# Patient Record
Sex: MALE | Race: WHITE | NOT HISPANIC OR LATINO | Employment: FULL TIME | URBAN - METROPOLITAN AREA
[De-identification: names, ages, dates, MRNs, and addresses within clinical notes are randomized per-mention and may not be internally consistent; named-entity substitution may affect disease eponyms.]

---

## 2018-03-20 ENCOUNTER — OFFICE VISIT (OUTPATIENT)
Dept: SLEEP CENTER | Facility: HOSPITAL | Age: 53
End: 2018-03-20
Attending: INTERNAL MEDICINE

## 2018-03-20 VITALS
HEART RATE: 60 BPM | HEIGHT: 73 IN | BODY MASS INDEX: 31.54 KG/M2 | DIASTOLIC BLOOD PRESSURE: 82 MMHG | WEIGHT: 238 LBS | SYSTOLIC BLOOD PRESSURE: 130 MMHG

## 2018-03-20 DIAGNOSIS — F51.12 INSUFFICIENT SLEEP SYNDROME: ICD-10-CM

## 2018-03-20 DIAGNOSIS — E66.9 OBESITY (BMI 30-39.9): ICD-10-CM

## 2018-03-20 DIAGNOSIS — R68.2 DRY MOUTH: ICD-10-CM

## 2018-03-20 DIAGNOSIS — G47.33 OSA (OBSTRUCTIVE SLEEP APNEA): Primary | ICD-10-CM

## 2018-03-20 DIAGNOSIS — G47.10 HYPERSOMNIA: ICD-10-CM

## 2018-03-20 NOTE — PROGRESS NOTES
Follow-Up Note - Sleep Center   Eric Graciela  46 y o  male  :1965  [id number]    CC: I saw this patient for follow-up in clinic today for his Sleep Disordered Breathing, Coexisting Sleep and Medical Problems  Medications, Past, Family & Social History were reviewed  Interval changes notable for none reported   He  has no past medical history on file  He currently has no medications in their medication list     ROS: reviewed, see attached   HPI:  With respect to positive airway pressure therapy (PAP) compliance data download shows: [ he is using PAP > 4 hours per night 97% of the time and AHI is 9/hour at 90th percentile pressure of 13cm H2O ]   Eric reports:   -  some difficulty tolerating PAP;  dry mouth and air leaks from mask  -  benefiting from use ; sleeping better      Sleep Routine:  Sade Alvarado reports getting 6 hours sleep week nights in a little more on weekends; he has no difficulty initiating or maintaining sleep   He awakens spontaneously feeling refreshed He denies excessive drowsiness  He rated himself at Total score: 10 /24 on the [place] sleepiness scale  Would doze if sedentary  EXAM: Vitals are stable: Blood pressure 130/82, pulse 60, height 6' 1" (1 854 m), weight 108 kg (238 lb)  Body mass index is 31 4 kg/m²  Manuel Crawford Neck Circumference: 42 cm  Patient is alert, orientated, cooperative and in no distress  Mental state appears normal  There are  facial pressure marks or rashes  Apart from weight gain, Physical findings are essentially unchanged from previous  IMPRESSION:     1  MARYANA (obstructive sleep apnea)  Cpap DME   2  Hypersomnia     3  Insufficient sleep syndrome     4  Dry mouth     5  Obesity (BMI 30-39  9)         PLAN:  1  Treatment with  PAP is medically necessary and Sade Alvarado is agreable to continue use  2  Pressure setting: Continue at higher setting of 14 cm H2O in view of the AHI and weight gain    3   Instruction on care of equipment and strategies to improve comfort with use of PAP were discussed [:controlling mucosal dryness, nasal symptoms and Mask leaks]  4  Care coordinated with DME provider and prescription to replace supplies as needed was provided  5  Need for compliance with therapy and weight reduction were emphasized  6  I also advised allowing sufficient opportunity for sleep  7  With your consent, follow-up is advised in 1 year or sooner if needed to monitor progress, compliance and to adjust therapy  Thank you for allowing me to participate in the care of this patient      Sincerely,    Cristopher Infante MD  Board Certified Specialist

## 2019-06-18 ENCOUNTER — OFFICE VISIT (OUTPATIENT)
Dept: SLEEP CENTER | Facility: HOSPITAL | Age: 54
End: 2019-06-18
Attending: INTERNAL MEDICINE
Payer: COMMERCIAL

## 2019-06-18 VITALS
HEIGHT: 73 IN | WEIGHT: 234 LBS | BODY MASS INDEX: 31.01 KG/M2 | DIASTOLIC BLOOD PRESSURE: 64 MMHG | SYSTOLIC BLOOD PRESSURE: 108 MMHG

## 2019-06-18 DIAGNOSIS — F51.12 INSUFFICIENT SLEEP SYNDROME: ICD-10-CM

## 2019-06-18 DIAGNOSIS — Z99.89 OSA ON CPAP: Primary | ICD-10-CM

## 2019-06-18 DIAGNOSIS — G47.33 OSA ON CPAP: Primary | ICD-10-CM

## 2019-06-18 DIAGNOSIS — E66.9 OBESITY (BMI 30-39.9): ICD-10-CM

## 2019-06-18 DIAGNOSIS — R68.2 DRY MOUTH: ICD-10-CM

## 2019-06-18 PROCEDURE — 99214 OFFICE O/P EST MOD 30 MIN: CPT | Performed by: INTERNAL MEDICINE

## 2019-06-27 ENCOUNTER — TELEPHONE (OUTPATIENT)
Dept: SLEEP CENTER | Facility: CLINIC | Age: 54
End: 2019-06-27

## 2020-06-16 ENCOUNTER — OFFICE VISIT (OUTPATIENT)
Dept: SLEEP CENTER | Facility: HOSPITAL | Age: 55
End: 2020-06-16
Payer: COMMERCIAL

## 2020-06-16 VITALS
DIASTOLIC BLOOD PRESSURE: 80 MMHG | BODY MASS INDEX: 32.87 KG/M2 | SYSTOLIC BLOOD PRESSURE: 118 MMHG | HEIGHT: 73 IN | WEIGHT: 248 LBS

## 2020-06-16 DIAGNOSIS — R68.2 DRY MOUTH: ICD-10-CM

## 2020-06-16 DIAGNOSIS — Z99.89 OSA ON CPAP: Primary | ICD-10-CM

## 2020-06-16 DIAGNOSIS — E66.9 OBESITY (BMI 30-39.9): ICD-10-CM

## 2020-06-16 DIAGNOSIS — F51.12 INSUFFICIENT SLEEP SYNDROME: ICD-10-CM

## 2020-06-16 DIAGNOSIS — G47.33 OSA ON CPAP: Primary | ICD-10-CM

## 2020-06-16 PROCEDURE — 99214 OFFICE O/P EST MOD 30 MIN: CPT | Performed by: INTERNAL MEDICINE

## 2020-06-17 ENCOUNTER — TELEPHONE (OUTPATIENT)
Dept: SLEEP CENTER | Facility: CLINIC | Age: 55
End: 2020-06-17

## 2021-09-21 ENCOUNTER — OFFICE VISIT (OUTPATIENT)
Dept: SLEEP CENTER | Facility: HOSPITAL | Age: 56
End: 2021-09-21
Payer: COMMERCIAL

## 2021-09-21 VITALS
BODY MASS INDEX: 33 KG/M2 | SYSTOLIC BLOOD PRESSURE: 128 MMHG | WEIGHT: 249 LBS | DIASTOLIC BLOOD PRESSURE: 72 MMHG | HEIGHT: 73 IN

## 2021-09-21 DIAGNOSIS — Z99.89 OSA ON CPAP: Primary | ICD-10-CM

## 2021-09-21 DIAGNOSIS — R68.2 DRY MOUTH: ICD-10-CM

## 2021-09-21 DIAGNOSIS — G47.33 OSA ON CPAP: Primary | ICD-10-CM

## 2021-09-21 DIAGNOSIS — F51.12 INSUFFICIENT SLEEP SYNDROME: ICD-10-CM

## 2021-09-21 DIAGNOSIS — E66.9 OBESITY (BMI 30-39.9): ICD-10-CM

## 2021-09-21 PROCEDURE — 99214 OFFICE O/P EST MOD 30 MIN: CPT | Performed by: INTERNAL MEDICINE

## 2021-09-21 NOTE — PROGRESS NOTES
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  Results of prior studies:  A diagnostic study in  demonstrated severe obstructive sleep apnea: AHI off 15 9 per hour with oxygen desaturation index of 24 per hour  Minimum oxygen saturation was 89%  During a subsequent therapeutic study, his sleep disordered breathing was adequately remediated with nasal CPAP at 13 cm H2O  PFSH, Problem List, Medications & Allergies were reviewed in EMR  Interval changes: none reported  He  has no past medical history on file  He currently has no medications in their medication list     PHYSIOLOGICAL DATA REVIEW AND INTERPRETATION:   using PAP > 4 hours/night 80%  Estimated KHUSHBU 3 3/hour with pressure of 16 cm H2O @90th percentile; Compliance: Within accepted guidelines; Sleep disordered breathing:stable & within target range; Patient has not been using ozone based or other devices to sanitize the machine  SUBJECTIVE: Regarding use of PAP, Eric reports:   · He is experiencing no  adverse effects:  Dry mouth is controlled with use of and oral spray ; has not noticed any fibres or foreign material in air line  · He is benefiting from use: sleeping better   Sleep Routine: Ronnell Dee reports getting 6 hrs sleep on work nights and more on days off; he has no difficulty initiating or maintaining sleep   He arises spontaneously and feels refreshed  Eric denies Excessive Daytime Sleepiness,   He rated himself at Total score: 4 /24 on the [place] Sleepiness Scale  Habits: reports that he has quit smoking   He has quit using smokeless tobacco ,  reports current alcohol use ,  reports no history of drug use , Caffeine use: limited , Exercise routine: regular    ROS: reviewed & as attached  Significant for weight has been stable  He reported no nasal, respiratory or cardiac symptoms  EXAM: /72   Ht 6' 1" (1 854 m)   Wt 113 kg (249 lb)   BMI 32 85 kg/m²     Patient is well groomed; well appearing  H&N: EOMI; NC/AT:no facial pressure marks, no rashes  Skin/Extrem: col & hydration normal; no edema  Psych: cooperativeand in no distress  Mental state:appears normal   Resp: Respiratory effort is normal  CNS: Alert, orientated, clear & coherent speech  Physical findings otherwise essentially unchanged from previous  IMPRESSION: Problem List Items & Comorbidities Addressed this Visit    1  MARYANA on CPAP  Cpap DME    PAP DME Resupply/Reorder   2  Insufficient sleep syndrome     3  Dry mouth     4  Obesity (BMI 30-39  9)         PLAN:  I reviewed results of prior studies and physiologic data with the patient  Notified regarding recall of [employer] devices and their recommendation to discontinue use pending resolution of degradation of the foam    Risks of discontinuing PAP vs continuing use while awaiting resolution of the recall were explained and patient indicates understanding  I discussed treatment options with risks and benefits  Procedure for registering machine with [employer] provided  & instructed patients who have, to track progress/updates on [facility]  Patient elected to weigh the options regarding use & may continue using Pap while awaiting remediation  Care of equipment, methods to improve comfort using PAP and importance of compliance with therapy were discussed  Instructed not to use ozone based or other unapproved  cleaning devices  Pressure settin-16 cmH2O  Rx provided to replace  supplies and Care coordinated with DME provider  Discussed strategies for weight reduction  Follow-up is advised in 1 year or sooner if needed to monitor progress, compliance and to adjust therapy  Thank you for allowing me to participate in the care of this patient  Sincerely,    Authenticated electronically by Erick Leach MD on 77/53/65   Board Certified Specialist     Portions of the record may have been created with voice recognition software  Occasional wrong word or "sound a like" substitutions may have occurred due to the inherent limitations of voice recognition software  There may also be notations and random deletions of words or characters from malfunctioning software  Read the chart carefully and recognize, using context, where substitutions/deletions have occurred

## 2021-09-21 NOTE — PATIENT INSTRUCTIONS
Continuous Positive Airway Pressure (CPAP) therapy was prescribed to you as a medical necessity and there are risks of discontinuing  use of the device, some of which may be long term  Symptoms you experienced before using CPAP may return such as snoring, apneas, excessive daytime sleepiness, hypertension, cardiac arrhythmias, risk of stroke, congestive heart-failure, exacerbation of COPD and potential respiratory failure  Ultimately, it is a personal decision for you to make if you continue use of an affected device or discontinue until a replacement is provided  Unfortunately, [employer] has provided us with limited information about available devices  However, recently some patients who registered the machines early on, already received replacement  You can visit their website at [email or web address] to register your device or www  [email or web address]  expertinquiry  com to learn more about how [employer] to replace your device  You can also try calling 3 [phone]  Another option would be to check with your medical equipment provider to determine if you are eligible for a new machine through your insurance, if not you can pay out of pocket for a new machine  According to [facility], an in line bacterial filter is not recommended for use with CPAP/BiPAP  If you wish to get a replacement machine, we are able to provide you with a script  Please include your mask type  Nursing Support:  When: Monday through Friday 7A-5PM except holidays  Where: Our direct line is [phone]  If you are having a true emergency please call 911  In the event that the line is busy or it is after hours please leave a voice message and we will return your call  Please speak clearly, leaving your full name, birth date, best number to reach you and the reason for your call  Medication refills:  We will need the name of the medication, the dosage, the ordering provider, whether you get a 30 or 90 day refill, and the pharmacy name and address  Medications will be ordered by the provider only  Nurses cannot call in prescriptions  Please allow 7 days for medication refills  Physician requested updates: If your provider requested that you call with an update after starting medication, please be ready to provide us the medication and dosage, what time you take your medication, the time you attempt to fall asleep, time you fall asleep, when you wake up, and what time you get out of bed  Sleep Study Results: We will contact you with sleep study results and/or next steps after the physician has reviewed your testing

## 2021-09-22 ENCOUNTER — TELEPHONE (OUTPATIENT)
Dept: SLEEP CENTER | Facility: CLINIC | Age: 56
End: 2021-09-22